# Patient Record
Sex: FEMALE | Race: WHITE | Employment: UNEMPLOYED | URBAN - METROPOLITAN AREA
[De-identification: names, ages, dates, MRNs, and addresses within clinical notes are randomized per-mention and may not be internally consistent; named-entity substitution may affect disease eponyms.]

---
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
## 2019-01-01 ENCOUNTER — HOSPITAL (OUTPATIENT)
Dept: OTHER | Age: 19
End: 2019-01-01
Attending: FAMILY MEDICINE

## 2019-03-27 ENCOUNTER — HOSPITAL ENCOUNTER (EMERGENCY)
Facility: HOSPITAL | Age: 19
Discharge: HOME | End: 2019-03-27
Attending: PEDIATRICS
Payer: COMMERCIAL

## 2019-03-27 VITALS
HEART RATE: 90 BPM | SYSTOLIC BLOOD PRESSURE: 123 MMHG | RESPIRATION RATE: 18 BRPM | WEIGHT: 129.63 LBS | TEMPERATURE: 98 F | OXYGEN SATURATION: 100 % | DIASTOLIC BLOOD PRESSURE: 77 MMHG | BODY MASS INDEX: 20 KG/M2

## 2019-03-27 DIAGNOSIS — S61.210A LACERATION OF RIGHT INDEX FINGER WITHOUT FOREIGN BODY WITHOUT DAMAGE TO NAIL, INITIAL ENCOUNTER: Primary | ICD-10-CM

## 2019-03-27 PROCEDURE — 99282 EMERGENCY DEPT VISIT SF MDM: CPT

## 2019-03-27 PROCEDURE — 12001 RPR S/N/AX/GEN/TRNK 2.5CM/<: CPT

## 2019-03-27 PROCEDURE — 99283 EMERGENCY DEPT VISIT LOW MDM: CPT

## 2019-03-27 RX ORDER — PROPRANOLOL HYDROCHLORIDE 10 MG/1
5 TABLET ORAL NIGHTLY
COMMUNITY

## 2019-03-27 RX ORDER — RANITIDINE 150 MG/1
150 CAPSULE ORAL 2 TIMES DAILY
COMMUNITY

## 2019-03-27 NOTE — ED INITIAL ASSESSMENT (HPI)
21 y/o female to ED with c/o of laceration to right index finger. Patient was cutting a tag off with scissors, reports scissors got caught on skin.

## 2019-03-27 NOTE — ED PROVIDER NOTES
Patient Seen in: [facility] Emergency Department    History   Patient presents with:  Laceration Abrasion (integumentary)    Stated Complaint: finger laceration    HPI    21-year-old female with right second finger laceration.   She is cutting intact intact   Neurological: She is alert and oriented to person, place, and time. No cranial nerve deficit. She exhibits normal muscle tone. Coordination normal.   Skin: Skin is warm. No rash noted. She is not diaphoretic. No pallor.    Psychiatric: She has a no or worsening symptoms.               Disposition and Plan     Clinical Impression:  Laceration of right index finger without foreign body without damage to nail, initial encounter  (primary encounter diagnosis)    Disposition:  Discharge  3/27/2019  4:58 pm

## 2021-05-21 ENCOUNTER — IMMUNIZATION (OUTPATIENT)
Dept: LAB | Facility: HOSPITAL | Age: 21
End: 2021-05-21
Attending: EMERGENCY MEDICINE
Payer: COMMERCIAL

## 2021-05-21 DIAGNOSIS — Z23 NEED FOR VACCINATION: Primary | ICD-10-CM

## 2021-05-21 PROCEDURE — 0001A SARSCOV2 VAC 30MCG/0.3ML IM: CPT

## 2021-06-11 ENCOUNTER — IMMUNIZATION (OUTPATIENT)
Dept: LAB | Facility: HOSPITAL | Age: 21
End: 2021-06-11
Attending: EMERGENCY MEDICINE
Payer: COMMERCIAL

## 2021-06-11 DIAGNOSIS — Z23 NEED FOR VACCINATION: Primary | ICD-10-CM

## 2021-06-11 PROCEDURE — 0002A SARSCOV2 VAC 30MCG/0.3ML IM: CPT

## 2023-06-08 ENCOUNTER — HOSPITAL ENCOUNTER (OUTPATIENT)
Dept: NUCLEAR MEDICINE | Facility: HOSPITAL | Age: 23
Discharge: HOME | End: 2023-06-08
Attending: FAMILY MEDICINE
Payer: COMMERCIAL

## 2023-06-08 DIAGNOSIS — K82.9 DISEASE OF GALLBLADDER: ICD-10-CM

## 2023-06-08 PROCEDURE — 78227 HEPATOBIL SYST IMAGE W/DRUG: CPT | Performed by: FAMILY MEDICINE

## 2023-07-12 ENCOUNTER — OFFICE VISIT (OUTPATIENT)
Facility: LOCATION | Age: 23
End: 2023-07-12
Payer: COMMERCIAL

## 2023-07-12 VITALS — HEART RATE: 97 BPM | TEMPERATURE: 99 F

## 2023-07-12 DIAGNOSIS — Q79.60 EHLERS-DANLOS SYNDROME: ICD-10-CM

## 2023-07-12 DIAGNOSIS — K82.8 BILIARY DYSKINESIA: Primary | ICD-10-CM

## 2023-07-17 ENCOUNTER — TELEPHONE (OUTPATIENT)
Facility: LOCATION | Age: 23
End: 2023-07-17

## 2023-07-17 DIAGNOSIS — K82.8 BILIARY DYSKINESIA: Primary | ICD-10-CM

## 2023-07-17 NOTE — TELEPHONE ENCOUNTER
S/w patient about increase of pain and frequency of gallbladder attacks. Pt reports low grade fevers after eating(t-max 100.9), denies nausea, having normal bms and has been sticking to a low fat diet. Pt has refrained from taking ibuprofen in hopes of having surgery moved up,  Discussed with Chuyita King and surgery rescheduled for 7/20. Reviews surgery check list with patient and case change placed in epic. Pt verbalized understanding.

## 2023-07-17 NOTE — TELEPHONE ENCOUNTER
Good evening,  The patient called and states she has a new symptoms and have some questions about. The symptoms are low grade fever, fatigue, increasing pain after meals. She just want some help on get a solution to the issues.     Call back # [phone]

## 2023-07-18 ENCOUNTER — LAB ENCOUNTER (OUTPATIENT)
Dept: LAB | Facility: HOSPITAL | Age: 23
End: 2023-07-18
Attending: SURGERY
Payer: COMMERCIAL

## 2023-07-18 ENCOUNTER — LABORATORY ENCOUNTER (OUTPATIENT)
Dept: LAB | Facility: HOSPITAL | Age: 23
End: 2023-07-18
Attending: SURGERY
Payer: COMMERCIAL

## 2023-07-18 ENCOUNTER — EKG ENCOUNTER (OUTPATIENT)
Dept: LAB | Facility: HOSPITAL | Age: 23
End: 2023-07-18
Attending: SURGERY
Payer: COMMERCIAL

## 2023-07-18 DIAGNOSIS — Z01.818 PRE-OP TESTING: ICD-10-CM

## 2023-07-18 LAB
ATRIAL RATE: 82 BPM
ERYTHROCYTE [DISTWIDTH] IN BLOOD BY AUTOMATED COUNT: 12.2 %
HCT VFR BLD AUTO: 37.9 %
HGB BLD-MCNC: 13.2 G/DL
MCH RBC QN AUTO: 31.7 PG (ref 26–34)
MCHC RBC AUTO-ENTMCNC: 34.8 G/DL (ref 31–37)
MCV RBC AUTO: 91.1 FL
PLATELET # BLD AUTO: 282 10(3)UL (ref 150–450)
Q-T INTERVAL: 370 MS
QRS DURATION: 90 MS
QTC CALCULATION (BEZET): 432 MS
R AXIS: 81 DEGREES
RBC # BLD AUTO: 4.16 X10(6)UL
T AXIS: 48 DEGREES
VENTRICULAR RATE: 82 BPM
WBC # BLD AUTO: 8 X10(3) UL (ref 4–11)

## 2023-07-18 PROCEDURE — 85027 COMPLETE CBC AUTOMATED: CPT

## 2023-07-18 PROCEDURE — 87635 SARS-COV-2 COVID-19 AMP PRB: CPT

## 2023-07-18 PROCEDURE — 36415 COLL VENOUS BLD VENIPUNCTURE: CPT

## 2023-07-18 PROCEDURE — 93010 ELECTROCARDIOGRAM REPORT: CPT | Performed by: INTERNAL MEDICINE

## 2023-07-18 PROCEDURE — 93005 ELECTROCARDIOGRAM TRACING: CPT

## 2023-07-19 LAB — SARS-COV-2 RNA RESP QL NAA+PROBE: NOT DETECTED

## 2023-07-20 ENCOUNTER — APPOINTMENT (OUTPATIENT)
Dept: GENERAL RADIOLOGY | Facility: HOSPITAL | Age: 23
End: 2023-07-20
Attending: SURGERY
Payer: COMMERCIAL

## 2023-07-20 ENCOUNTER — ANESTHESIA EVENT (OUTPATIENT)
Dept: SURGERY | Facility: HOSPITAL | Age: 23
End: 2023-07-20
Payer: COMMERCIAL

## 2023-07-20 ENCOUNTER — HOSPITAL ENCOUNTER (OUTPATIENT)
Facility: HOSPITAL | Age: 23
Setting detail: HOSPITAL OUTPATIENT SURGERY
Discharge: HOME | End: 2023-07-20
Attending: SURGERY | Admitting: SURGERY
Payer: COMMERCIAL

## 2023-07-20 ENCOUNTER — ANESTHESIA (OUTPATIENT)
Dept: SURGERY | Facility: HOSPITAL | Age: 23
End: 2023-07-20
Payer: COMMERCIAL

## 2023-07-20 VITALS
WEIGHT: 127 LBS | BODY MASS INDEX: 19.93 KG/M2 | HEART RATE: 96 BPM | OXYGEN SATURATION: 99 % | HEIGHT: 67 IN | TEMPERATURE: 99 F | RESPIRATION RATE: 16 BRPM | DIASTOLIC BLOOD PRESSURE: 69 MMHG | SYSTOLIC BLOOD PRESSURE: 106 MMHG

## 2023-07-20 DIAGNOSIS — Z01.818 PRE-OP TESTING: Primary | ICD-10-CM

## 2023-07-20 DIAGNOSIS — K82.8 BILIARY DYSKINESIA: ICD-10-CM

## 2023-07-20 LAB — B-HCG UR QL: NEGATIVE

## 2023-07-20 PROCEDURE — 0FT44ZZ RESECTION OF GALLBLADDER, PERCUTANEOUS ENDOSCOPIC APPROACH: ICD-10-PCS | Performed by: SURGERY

## 2023-07-20 PROCEDURE — BF532Z0 OTHER IMAGING OF GALLBLADDER AND BILE DUCTS USING FLUORESCING AGENT, INTRAOPERATIVE: ICD-10-PCS | Performed by: SURGERY

## 2023-07-20 PROCEDURE — 81025 URINE PREGNANCY TEST: CPT

## 2023-07-20 RX ORDER — SODIUM CHLORIDE, SODIUM LACTATE, POTASSIUM CHLORIDE, CALCIUM CHLORIDE 600; 310; 30; 20 MG/100ML; MG/100ML; MG/100ML; MG/100ML
INJECTION, SOLUTION INTRAVENOUS CONTINUOUS
Status: DISCONTINUED | OUTPATIENT
Start: 2023-07-20 | End: 2023-07-20

## 2023-07-20 RX ORDER — HYDROCODONE BITARTRATE AND ACETAMINOPHEN 5; 325 MG/1; MG/1
2 TABLET ORAL ONCE AS NEEDED
Status: COMPLETED | OUTPATIENT
Start: 2023-07-20 | End: 2023-07-20

## 2023-07-20 RX ORDER — HYDROCODONE BITARTRATE AND ACETAMINOPHEN 5; 325 MG/1; MG/1
1 TABLET ORAL ONCE AS NEEDED
Status: COMPLETED | OUTPATIENT
Start: 2023-07-20 | End: 2023-07-20

## 2023-07-20 RX ORDER — MIDAZOLAM HYDROCHLORIDE 1 MG/ML
INJECTION INTRAMUSCULAR; INTRAVENOUS AS NEEDED
Status: DISCONTINUED | OUTPATIENT
Start: 2023-07-20 | End: 2023-07-20 | Stop reason: SURG

## 2023-07-20 RX ORDER — ACETAMINOPHEN 10 MG/ML
INJECTION, SOLUTION INTRAVENOUS AS NEEDED
Status: DISCONTINUED | OUTPATIENT
Start: 2023-07-20 | End: 2023-07-20 | Stop reason: SURG

## 2023-07-20 RX ORDER — PROCHLORPERAZINE EDISYLATE 5 MG/ML
5 INJECTION INTRAMUSCULAR; INTRAVENOUS EVERY 8 HOURS PRN
Status: DISCONTINUED | OUTPATIENT
Start: 2023-07-20 | End: 2023-07-20

## 2023-07-20 RX ORDER — BUPIVACAINE HYDROCHLORIDE AND EPINEPHRINE 5; 5 MG/ML; UG/ML
INJECTION, SOLUTION EPIDURAL; INTRACAUDAL; PERINEURAL AS NEEDED
Status: DISCONTINUED | OUTPATIENT
Start: 2023-07-20 | End: 2023-07-20

## 2023-07-20 RX ORDER — HEPARIN SODIUM 5000 [USP'U]/ML
5000 INJECTION, SOLUTION INTRAVENOUS; SUBCUTANEOUS ONCE
Status: COMPLETED | OUTPATIENT
Start: 2023-07-20 | End: 2023-07-20

## 2023-07-20 RX ORDER — NALOXONE HYDROCHLORIDE 0.4 MG/ML
80 INJECTION, SOLUTION INTRAMUSCULAR; INTRAVENOUS; SUBCUTANEOUS AS NEEDED
Status: DISCONTINUED | OUTPATIENT
Start: 2023-07-20 | End: 2023-07-20

## 2023-07-20 RX ORDER — ACETAMINOPHEN 500 MG
1000 TABLET ORAL ONCE AS NEEDED
Status: COMPLETED | OUTPATIENT
Start: 2023-07-20 | End: 2023-07-20

## 2023-07-20 RX ORDER — PHENYLEPHRINE HCL 10 MG/ML
VIAL (ML) INJECTION AS NEEDED
Status: DISCONTINUED | OUTPATIENT
Start: 2023-07-20 | End: 2023-07-20 | Stop reason: SURG

## 2023-07-20 RX ORDER — HYDROCODONE BITARTRATE AND ACETAMINOPHEN 5; 325 MG/1; MG/1
1 TABLET ORAL EVERY 6 HOURS PRN
Qty: 10 TABLET | Refills: 0 | Status: SHIPPED | OUTPATIENT
Start: 2023-07-20

## 2023-07-20 RX ORDER — ONDANSETRON 2 MG/ML
4 INJECTION INTRAMUSCULAR; INTRAVENOUS EVERY 6 HOURS PRN
Status: DISCONTINUED | OUTPATIENT
Start: 2023-07-20 | End: 2023-07-20

## 2023-07-20 RX ORDER — ONDANSETRON 2 MG/ML
INJECTION INTRAMUSCULAR; INTRAVENOUS AS NEEDED
Status: DISCONTINUED | OUTPATIENT
Start: 2023-07-20 | End: 2023-07-20 | Stop reason: SURG

## 2023-07-20 RX ORDER — MIDAZOLAM HYDROCHLORIDE 1 MG/ML
1 INJECTION INTRAMUSCULAR; INTRAVENOUS EVERY 5 MIN PRN
Status: DISCONTINUED | OUTPATIENT
Start: 2023-07-20 | End: 2023-07-20

## 2023-07-20 RX ORDER — LIDOCAINE HYDROCHLORIDE 10 MG/ML
INJECTION, SOLUTION EPIDURAL; INFILTRATION; INTRACAUDAL; PERINEURAL AS NEEDED
Status: DISCONTINUED | OUTPATIENT
Start: 2023-07-20 | End: 2023-07-20 | Stop reason: SURG

## 2023-07-20 RX ORDER — ROCURONIUM BROMIDE 10 MG/ML
INJECTION, SOLUTION INTRAVENOUS AS NEEDED
Status: DISCONTINUED | OUTPATIENT
Start: 2023-07-20 | End: 2023-07-20 | Stop reason: SURG

## 2023-07-20 RX ORDER — KETOROLAC TROMETHAMINE 30 MG/ML
INJECTION, SOLUTION INTRAMUSCULAR; INTRAVENOUS AS NEEDED
Status: DISCONTINUED | OUTPATIENT
Start: 2023-07-20 | End: 2023-07-20 | Stop reason: SURG

## 2023-07-20 RX ORDER — DEXAMETHASONE SODIUM PHOSPHATE 4 MG/ML
VIAL (ML) INJECTION AS NEEDED
Status: DISCONTINUED | OUTPATIENT
Start: 2023-07-20 | End: 2023-07-20 | Stop reason: SURG

## 2023-07-20 RX ORDER — CLINDAMYCIN PHOSPHATE 900 MG/50ML
900 INJECTION INTRAVENOUS ONCE
Status: COMPLETED | OUTPATIENT
Start: 2023-07-20 | End: 2023-07-20

## 2023-07-20 RX ORDER — ACETAMINOPHEN 500 MG
1000 TABLET ORAL ONCE
Status: DISCONTINUED | OUTPATIENT
Start: 2023-07-20 | End: 2023-07-20 | Stop reason: HOSPADM

## 2023-07-20 RX ADMIN — MIDAZOLAM HYDROCHLORIDE 2 MG: 1 INJECTION INTRAMUSCULAR; INTRAVENOUS at 15:48:00

## 2023-07-20 RX ADMIN — CLINDAMYCIN PHOSPHATE 900 MG: 900 INJECTION INTRAVENOUS at 16:02:00

## 2023-07-20 RX ADMIN — DEXAMETHASONE SODIUM PHOSPHATE 8 MG: 4 MG/ML VIAL (ML) INJECTION at 16:00:00

## 2023-07-20 RX ADMIN — ROCURONIUM BROMIDE 50 MG: 10 INJECTION, SOLUTION INTRAVENOUS at 16:00:00

## 2023-07-20 RX ADMIN — KETOROLAC TROMETHAMINE 30 MG: 30 INJECTION, SOLUTION INTRAMUSCULAR; INTRAVENOUS at 16:58:00

## 2023-07-20 RX ADMIN — ACETAMINOPHEN 1000 MG: 10 INJECTION, SOLUTION INTRAVENOUS at 16:26:00

## 2023-07-20 RX ADMIN — ONDANSETRON 4 MG: 2 INJECTION INTRAMUSCULAR; INTRAVENOUS at 16:58:00

## 2023-07-20 RX ADMIN — SODIUM CHLORIDE, SODIUM LACTATE, POTASSIUM CHLORIDE, CALCIUM CHLORIDE: 600; 310; 30; 20 INJECTION, SOLUTION INTRAVENOUS at 15:48:00

## 2023-07-20 RX ADMIN — LIDOCAINE HYDROCHLORIDE 50 MG: 10 INJECTION, SOLUTION EPIDURAL; INFILTRATION; INTRACAUDAL; PERINEURAL at 16:00:00

## 2023-07-20 RX ADMIN — PHENYLEPHRINE HCL 50 MCG: 10 MG/ML VIAL (ML) INJECTION at 16:26:00

## 2023-07-20 RX ADMIN — PHENYLEPHRINE HCL 50 MCG: 10 MG/ML VIAL (ML) INJECTION at 16:16:00

## 2023-07-20 NOTE — INTERVAL H&P NOTE
Pre-op Diagnosis: Biliary dyskinesia [K82.8]    The above referenced H&P was reviewed by Yash Kaye MD on 7/20/2023, the patient was examined and no significant changes have occurred in the patient's condition since the H&P was performed. I discussed with the patient and/or legal representative the potential benefits, risks and side effects of this procedure; the likelihood of the patient achieving goals; and potential problems that might occur during recuperation. I discussed reasonable alternatives to the procedure, including risks, benefits and side effects related to the alternatives and risks related to not receiving this procedure. We will proceed with procedure as planned.

## 2023-07-20 NOTE — ANESTHESIA POSTPROCEDURE EVALUATION
[address] Patient Status:  Hospital Outpatient Surgery   Age/Gender 24year old female MRN [id number]   [facility] SURGERY Attending Lory Aburto MD   [facility] Day # 0 PCP France Ball DO       Anesthesia Post-op Note    LAPAROSCOPIC CHOLECYSTECTOMY WITH ATTEMPTED CHOLANGIOGRAM    Procedure Summary       Date: 07/20/23 Room / Location: [facility] MAIN OR 15 / 1404 [facility] OR    Anesthesia Start: 8401 Anesthesia Stop: 2179    Procedure: LAPAROSCOPIC CHOLECYSTECTOMY WITH ATTEMPTED CHOLANGIOGRAM (Abdomen) Diagnosis:       Biliary dyskinesia      (Biliary dyskinesia [K82.8])    Surgeons: Lory Aburto MD Anesthesiologist: Jennifer Bravo MD    Anesthesia Type: general ASA Status: 2            Anesthesia Type: general    Vitals Value Taken Time   /72 07/20/23 1727   Temp 97 07/20/23 1727   Pulse 110 07/20/23 1727   Resp 19 07/20/23 1727   SpO2 99 07/20/23 1727       Patient Location: PACU    Anesthesia Type: general    Airway Patency: patent    Postop Pain Control: adequate    Mental Status: mildly sedated but able to meaningfully participate in the post-anesthesia evaluation    Nausea/Vomiting: none    Cardiopulmonary/Hydration status: stable euvolemic    Complications: no apparent anesthesia related complications    Postop vital signs: stable    Dental Exam: Unchanged from Preop    Patient to be discharged from PACU when criteria met.

## 2023-07-20 NOTE — OPERATIVE REPORT
[facility]  Op Note    Latricia Spine Location: OR   CSN [id number] MRN [id number]   Admission Date 7/20/2023 Operation Date 7/20/2023   Attending Physician Noelle Bee MD Operating Physician Ehsan Coker MD   DATE OF OPERATION:  7/20/2023   PREOPERATIVE DIAGNOSIS: Biliary dyskinesia  POSTOPERATIVE DIAGNOSIS: Biliary dyskinesia  PROCEDURE PERFORMED: Laparoscopic cholecystectomy with attempted intraoperative cholangiogram.   SURGEON:  Ehsan Coker MD  ASSISTANT: Torie Mercer PA-C (Her assistance was essential to the performance and conduct of this case, especially in the performance of the cholangiogram and the careful dissection needed in and around the triangle of Calot and gallbladder hilum.)  ANESTHESIA: General.   SPECIMEN: Gallbladder to pathology. BLOOD LOSS:  10 cc   COMPLICATIONS: None. INDICATIONS FOR PROCEDURE: The patient is a 50-year-old female who has been experiencing abdominal pain after eating fatty foods for over a year. Work-up included a HIDA scan that reproduced her pain. She was offered laparoscopic cholecystectomy intraoperative cholangiogram.  The risks, benefits, and alternatives were discussed in detail with the patient. Risks include but are not limited to bleeding, wound infection, right shoulder pain, injury to common bile duct, injury to the liver and injury to other intraabdominal contents. The patient was agreeable to proceed with the operation. FINDINGS: The cystic duct was tiny, permitting cannulation by only the wire used introduced the cholangiocatheter. The cholangiocatheter was never able to be advanced into the duct itself. Therefore, after multiple attempts, cholangiography was aborted. The gallbladder itself did have dense adhesions at the hilum. OPERATIVE TECHNIQUE: After informed consent was obtained, the patient was taken to the operating room and placed in the supine position. General anesthesia was induced.  The abdomen was then prepped and draped in the usual sterile fashion. The umbilicus was inverted, and a curvilinear incision was made superior to it with an 11-blade scalpel. The Veress needle was passed into the abdomen, and pneumoperitoneum was instituted without difficulty. The 11 mm trocar was passed through this incision site. The abdomen was inspected and found to be grossly normal. Under direct vision, a 5 mm subxiphoid port and two right-sided lateral 5 mm ports were placed. The patient was placed head-up, right side up. The gallbladder was grasped and retracted cranially. Dissection commenced around the cystic duct and cystic artery, which were isolated. Dissection was kept above the line of Rouviere. The critical view was obtained, demonstrating 2 structures directly entering the gallbladder. One clip was placed towards the gallbladder on the cystic duct, and the duct was partially transected. The cystic duct was attempted to be cannulated. The cystic duct stump was so tiny that it only permitted the wire to be introduced. When the cholangiocatheter was advanced, the catheter was the same size as the duct, and the duct would not permit its introduction. After multiple attempts to cannulate the duct, the cholangiogram was aborted. Three clips were placed opposite the partial transection. The duct was then completely transected. The cystic artery was isolated, one clip was placed towards the gallbladder, two away, and the artery was transected. The gallbladder was then dissected free from the liver bed using cautery. The gallbladder was then withdrawn through the umbilical port site after being placed in an Endo Catch bag. Inspection of the liver bed revealed some bleeding that was controlled with cautery. The abdomen was copiously irrigated with normal saline. Once hemostasis was assured and the irrigant returned as clear and colorless, the right-sided ports were withdrawn under direct vision.  The pneumoperitoneum was aspirated, and the remaining ports were withdrawn. The fascia at the umbilical port site was reapproximated with Maxon suture, and the skin at all four incisions was reapproximated with subcuticular Vicryl suture. 0.5% Marcaine with epinephrine was injected around the incisions to help with postoperative pain control. Steri-Strips and Mastisol were placed across the incisions. The patient tolerated the procedure well, was extubated in the OR, and went to the PACU in good condition.   Jeff Tsang MD

## 2023-07-20 NOTE — ANESTHESIA PROCEDURE NOTES
Airway  Date/Time: 7/20/2023 4:00 PM  Urgency: elective      General Information and Staff    Patient location during procedure: OR  Anesthesiologist: Leotha Lennox, MD  Performed: anesthesiologist   Performed by: Leotha Lennox, MD  Authorized by: Leotha Lennox, MD      Indications and Patient Condition  Indications for airway management: anesthesia  Sedation level: deep  Preoxygenated: yes  Patient position: sniffing  Mask difficulty assessment: 1 - vent by mask    Final Airway Details  Final airway type: endotracheal airway      Successful airway: ETT  Cuffed: yes   Successful intubation technique: direct laryngoscopy  Endotracheal tube insertion site: oral  Blade: GlideScope  Blade size: #3  ETT size (mm): 7.0    Placement verified by: capnometry   Cuff volume (mL): 6  Measured from: lips  ETT to lips (cm): 22  Number of attempts at approach: 1    Additional Comments  atraumatic

## 2023-07-21 ENCOUNTER — APPOINTMENT (OUTPATIENT)
Dept: NUCLEAR MEDICINE | Facility: HOSPITAL | Age: 23
End: 2023-07-21
Attending: EMERGENCY MEDICINE
Payer: COMMERCIAL

## 2023-07-21 ENCOUNTER — HOSPITAL ENCOUNTER (OUTPATIENT)
Facility: HOSPITAL | Age: 23
Setting detail: OBSERVATION
Discharge: HOME | End: 2023-07-22
Attending: EMERGENCY MEDICINE | Admitting: SURGERY
Payer: COMMERCIAL

## 2023-07-21 ENCOUNTER — APPOINTMENT (OUTPATIENT)
Dept: CT IMAGING | Facility: HOSPITAL | Age: 23
End: 2023-07-21
Attending: EMERGENCY MEDICINE
Payer: COMMERCIAL

## 2023-07-21 ENCOUNTER — TELEPHONE (OUTPATIENT)
Facility: LOCATION | Age: 23
End: 2023-07-21

## 2023-07-21 DIAGNOSIS — R10.9 ABDOMINAL PAIN OF UNKNOWN ETIOLOGY: Primary | ICD-10-CM

## 2023-07-21 DIAGNOSIS — N39.0 URINARY TRACT INFECTION WITHOUT HEMATURIA, SITE UNSPECIFIED: ICD-10-CM

## 2023-07-21 LAB
ALBUMIN SERPL-MCNC: 3.9 G/DL (ref 3.4–5)
ALBUMIN/GLOB SERPL: 1.1 {RATIO} (ref 1–2)
ALP LIVER SERPL-CCNC: 47 U/L
ALT SERPL-CCNC: 55 U/L
ANION GAP SERPL CALC-SCNC: 4 MMOL/L (ref 0–18)
AST SERPL-CCNC: 39 U/L (ref 15–37)
BASOPHILS # BLD AUTO: 0.02 X10(3) UL (ref 0–0.2)
BASOPHILS NFR BLD AUTO: 0.1 %
BILIRUB SERPL-MCNC: 0.4 MG/DL (ref 0.1–2)
BILIRUB UR QL STRIP.AUTO: NEGATIVE
BUN BLD-MCNC: 6 MG/DL (ref 7–18)
CALCIUM BLD-MCNC: 9.1 MG/DL (ref 8.5–10.1)
CHLORIDE SERPL-SCNC: 106 MMOL/L (ref 98–112)
CLARITY UR REFRACT.AUTO: CLEAR
CO2 SERPL-SCNC: 26 MMOL/L (ref 21–32)
CREAT BLD-MCNC: 0.67 MG/DL
EGFRCR SERPLBLD CKD-EPI 2021: 125 ML/MIN/1.73M2 (ref 60–?)
EOSINOPHIL # BLD AUTO: 0.04 X10(3) UL (ref 0–0.7)
EOSINOPHIL NFR BLD AUTO: 0.3 %
ERYTHROCYTE [DISTWIDTH] IN BLOOD BY AUTOMATED COUNT: 12.1 %
GLOBULIN PLAS-MCNC: 3.4 G/DL (ref 2.8–4.4)
GLUCOSE BLD-MCNC: 99 MG/DL (ref 70–99)
GLUCOSE UR STRIP.AUTO-MCNC: NEGATIVE MG/DL
HCG SERPL QL: NEGATIVE
HCT VFR BLD AUTO: 37.2 %
HGB BLD-MCNC: 12.6 G/DL
IMM GRANULOCYTES # BLD AUTO: 0.07 X10(3) UL (ref 0–1)
IMM GRANULOCYTES NFR BLD: 0.5 %
LIPASE SERPL-CCNC: 17 U/L (ref 13–75)
LYMPHOCYTES # BLD AUTO: 0.93 X10(3) UL (ref 1–4)
LYMPHOCYTES NFR BLD AUTO: 6 %
MCH RBC QN AUTO: 31.1 PG (ref 26–34)
MCHC RBC AUTO-ENTMCNC: 33.9 G/DL (ref 31–37)
MCV RBC AUTO: 91.9 FL
MONOCYTES # BLD AUTO: 0.72 X10(3) UL (ref 0.1–1)
MONOCYTES NFR BLD AUTO: 4.7 %
NEUTROPHILS # BLD AUTO: 13.68 X10 (3) UL (ref 1.5–7.7)
NEUTROPHILS # BLD AUTO: 13.68 X10(3) UL (ref 1.5–7.7)
NEUTROPHILS NFR BLD AUTO: 88.4 %
NITRITE UR QL STRIP.AUTO: NEGATIVE
OSMOLALITY SERPL CALC.SUM OF ELEC: 280 MOSM/KG (ref 275–295)
PH UR STRIP.AUTO: 8 [PH] (ref 5–8)
PLATELET # BLD AUTO: 255 10(3)UL (ref 150–450)
POTASSIUM SERPL-SCNC: 3.9 MMOL/L (ref 3.5–5.1)
PROT SERPL-MCNC: 7.3 G/DL (ref 6.4–8.2)
PROT UR STRIP.AUTO-MCNC: 100 MG/DL
RBC # BLD AUTO: 4.05 X10(6)UL
RBC #/AREA URNS AUTO: >10 /HPF
SODIUM SERPL-SCNC: 136 MMOL/L (ref 136–145)
SP GR UR STRIP.AUTO: 1 (ref 1–1.03)
UROBILINOGEN UR STRIP.AUTO-MCNC: <2 MG/DL
WBC # BLD AUTO: 15.5 X10(3) UL (ref 4–11)
WBC #/AREA URNS AUTO: >50 /HPF
YEAST UR QL: PRESENT /HPF

## 2023-07-21 PROCEDURE — 78226 HEPATOBILIARY SYSTEM IMAGING: CPT | Performed by: EMERGENCY MEDICINE

## 2023-07-21 PROCEDURE — 78830 RP LOCLZJ TUM SPECT W/CT 1: CPT | Performed by: EMERGENCY MEDICINE

## 2023-07-21 PROCEDURE — 74177 CT ABD & PELVIS W/CONTRAST: CPT | Performed by: EMERGENCY MEDICINE

## 2023-07-21 PROCEDURE — 99222 1ST HOSP IP/OBS MODERATE 55: CPT | Performed by: INTERNAL MEDICINE

## 2023-07-21 RX ORDER — HYDROCODONE BITARTRATE AND ACETAMINOPHEN 5; 325 MG/1; MG/1
2 TABLET ORAL EVERY 4 HOURS PRN
Status: DISCONTINUED | OUTPATIENT
Start: 2023-07-21 | End: 2023-07-22

## 2023-07-21 RX ORDER — KETOROLAC TROMETHAMINE 30 MG/ML
30 INJECTION, SOLUTION INTRAMUSCULAR; INTRAVENOUS EVERY 6 HOURS PRN
Status: DISCONTINUED | OUTPATIENT
Start: 2023-07-21 | End: 2023-07-22

## 2023-07-21 RX ORDER — ONDANSETRON 2 MG/ML
4 INJECTION INTRAMUSCULAR; INTRAVENOUS EVERY 6 HOURS PRN
Status: DISCONTINUED | OUTPATIENT
Start: 2023-07-21 | End: 2023-07-22

## 2023-07-21 RX ORDER — ONDANSETRON 2 MG/ML
4 INJECTION INTRAMUSCULAR; INTRAVENOUS ONCE
Status: DISCONTINUED | OUTPATIENT
Start: 2023-07-21 | End: 2023-07-22

## 2023-07-21 RX ORDER — MELATONIN
3 NIGHTLY PRN
Status: DISCONTINUED | OUTPATIENT
Start: 2023-07-21 | End: 2023-07-22

## 2023-07-21 RX ORDER — HYDROCODONE BITARTRATE AND ACETAMINOPHEN 5; 325 MG/1; MG/1
1 TABLET ORAL EVERY 4 HOURS PRN
Status: DISCONTINUED | OUTPATIENT
Start: 2023-07-21 | End: 2023-07-22

## 2023-07-21 RX ORDER — PANTOPRAZOLE SODIUM 40 MG/1
40 TABLET, DELAYED RELEASE ORAL
Status: DISCONTINUED | OUTPATIENT
Start: 2023-07-22 | End: 2023-07-22

## 2023-07-21 RX ORDER — ONDANSETRON 2 MG/ML
4 INJECTION INTRAMUSCULAR; INTRAVENOUS ONCE
Status: COMPLETED | OUTPATIENT
Start: 2023-07-21 | End: 2023-07-21

## 2023-07-21 RX ORDER — SODIUM CHLORIDE 9 MG/ML
INJECTION, SOLUTION INTRAVENOUS CONTINUOUS
Status: DISCONTINUED | OUTPATIENT
Start: 2023-07-21 | End: 2023-07-22

## 2023-07-21 RX ORDER — HEPARIN SODIUM 5000 [USP'U]/ML
5000 INJECTION, SOLUTION INTRAVENOUS; SUBCUTANEOUS EVERY 12 HOURS SCHEDULED
Status: DISCONTINUED | OUTPATIENT
Start: 2023-07-21 | End: 2023-07-22

## 2023-07-21 RX ORDER — KETOROLAC TROMETHAMINE 15 MG/ML
15 INJECTION, SOLUTION INTRAMUSCULAR; INTRAVENOUS EVERY 6 HOURS PRN
Status: DISCONTINUED | OUTPATIENT
Start: 2023-07-21 | End: 2023-07-22

## 2023-07-21 RX ORDER — FLUCONAZOLE 100 MG/1
150 TABLET ORAL ONCE
Status: COMPLETED | OUTPATIENT
Start: 2023-07-22 | End: 2023-07-22

## 2023-07-21 RX ORDER — ACETAMINOPHEN 500 MG
500 TABLET ORAL EVERY 4 HOURS PRN
Status: DISCONTINUED | OUTPATIENT
Start: 2023-07-21 | End: 2023-07-22

## 2023-07-21 RX ORDER — ONDANSETRON 2 MG/ML
4 INJECTION INTRAMUSCULAR; INTRAVENOUS EVERY 4 HOURS PRN
Status: CANCELLED | OUTPATIENT
Start: 2023-07-21 | End: 2023-07-21

## 2023-07-21 RX ORDER — KETOROLAC TROMETHAMINE 15 MG/ML
15 INJECTION, SOLUTION INTRAMUSCULAR; INTRAVENOUS ONCE
Status: COMPLETED | OUTPATIENT
Start: 2023-07-21 | End: 2023-07-21

## 2023-07-22 VITALS
WEIGHT: 132.81 LBS | RESPIRATION RATE: 16 BRPM | TEMPERATURE: 98 F | OXYGEN SATURATION: 95 % | BODY MASS INDEX: 21 KG/M2 | SYSTOLIC BLOOD PRESSURE: 100 MMHG | HEART RATE: 63 BPM | DIASTOLIC BLOOD PRESSURE: 70 MMHG

## 2023-07-22 LAB
ANION GAP SERPL CALC-SCNC: 6 MMOL/L (ref 0–18)
BASOPHILS # BLD AUTO: 0.04 X10(3) UL (ref 0–0.2)
BASOPHILS NFR BLD AUTO: 0.6 %
BUN BLD-MCNC: 6 MG/DL (ref 7–18)
CALCIUM BLD-MCNC: 8.5 MG/DL (ref 8.5–10.1)
CHLORIDE SERPL-SCNC: 111 MMOL/L (ref 98–112)
CO2 SERPL-SCNC: 24 MMOL/L (ref 21–32)
CREAT BLD-MCNC: 0.82 MG/DL
EGFRCR SERPLBLD CKD-EPI 2021: 102 ML/MIN/1.73M2 (ref 60–?)
EOSINOPHIL # BLD AUTO: 0.09 X10(3) UL (ref 0–0.7)
EOSINOPHIL NFR BLD AUTO: 1.3 %
ERYTHROCYTE [DISTWIDTH] IN BLOOD BY AUTOMATED COUNT: 12.6 %
GLUCOSE BLD-MCNC: 84 MG/DL (ref 70–99)
HCT VFR BLD AUTO: 33.2 %
HGB BLD-MCNC: 11.4 G/DL
IMM GRANULOCYTES # BLD AUTO: 0.02 X10(3) UL (ref 0–1)
IMM GRANULOCYTES NFR BLD: 0.3 %
LIPASE SERPL-CCNC: 17 U/L (ref 13–75)
LYMPHOCYTES # BLD AUTO: 2.03 X10(3) UL (ref 1–4)
LYMPHOCYTES NFR BLD AUTO: 29 %
MCH RBC QN AUTO: 31.8 PG (ref 26–34)
MCHC RBC AUTO-ENTMCNC: 34.3 G/DL (ref 31–37)
MCV RBC AUTO: 92.5 FL
MONOCYTES # BLD AUTO: 0.51 X10(3) UL (ref 0.1–1)
MONOCYTES NFR BLD AUTO: 7.3 %
NEUTROPHILS # BLD AUTO: 4.31 X10 (3) UL (ref 1.5–7.7)
NEUTROPHILS # BLD AUTO: 4.31 X10(3) UL (ref 1.5–7.7)
NEUTROPHILS NFR BLD AUTO: 61.5 %
OSMOLALITY SERPL CALC.SUM OF ELEC: 289 MOSM/KG (ref 275–295)
PLATELET # BLD AUTO: 233 10(3)UL (ref 150–450)
POTASSIUM SERPL-SCNC: 3.6 MMOL/L (ref 3.5–5.1)
RBC # BLD AUTO: 3.59 X10(6)UL
SODIUM SERPL-SCNC: 141 MMOL/L (ref 136–145)
WBC # BLD AUTO: 7 X10(3) UL (ref 4–11)

## 2023-07-22 PROCEDURE — 99239 HOSP IP/OBS DSCHRG MGMT >30: CPT | Performed by: STUDENT IN AN ORGANIZED HEALTH CARE EDUCATION/TRAINING PROGRAM

## 2023-07-22 RX ORDER — DOCUSATE SODIUM 100 MG/1
100 CAPSULE, LIQUID FILLED ORAL
Status: DISCONTINUED | OUTPATIENT
Start: 2023-07-22 | End: 2023-07-22

## 2023-07-22 RX ORDER — NITROFURANTOIN 25; 75 MG/1; MG/1
100 CAPSULE ORAL 2 TIMES DAILY
Qty: 14 CAPSULE | Refills: 0 | Status: SHIPPED | OUTPATIENT
Start: 2023-07-22 | End: 2023-07-29

## 2023-07-22 RX ORDER — POLYETHYLENE GLYCOL 3350 17 G/17G
17 POWDER, FOR SOLUTION ORAL DAILY PRN
Status: DISCONTINUED | OUTPATIENT
Start: 2023-07-22 | End: 2023-07-22

## 2023-07-22 RX ORDER — ONDANSETRON 4 MG/1
4 TABLET, ORALLY DISINTEGRATING ORAL EVERY 8 HOURS PRN
Qty: 15 TABLET | Refills: 0 | Status: SHIPPED | OUTPATIENT
Start: 2023-07-22

## 2023-07-22 NOTE — ED QUICK NOTES
Orders for admission, patient is aware of plan and ready to go upstairs. Any questions, please call ED RN [address] at extension [phone].      Patient Covid vaccination status: Fully vaccinated     COVID Test Ordered in ED: None    COVID Suspicion at Admission: N/A    Running Infusions:      Mental Status/LOC at time of transport: A&Ox4    Other pertinent information:   CIWA score: N/A   NIH score:  N/A

## 2023-07-22 NOTE — PROGRESS NOTES
Pt is alert and oriented x4. On room air. Denies shortness of breath or difficulty in breathing. No gas and not belching. Pain being managed through mar. Up independently. Ivf infusing. On a clear liquid diet. X4 lap sites with steri strips. Updated on plan of care. Call light within reach. Pt cleared for dc. Waiting for med rec to be completed.  Family updated on plan

## 2023-07-22 NOTE — TELEPHONE ENCOUNTER
Late entry-    Nae's mother called the office this morning at approximately 9 AM this morning. She stated that Nalini Cordon had laparoscopic cholecystectomy   Yesterday performed by Dr. Amanda Najera. Per the operative report, diagnosis was biliary dyskinesia and intraoperative cholangiogram was not able to be performed. Nae's mother reports that Nalini Cordon has had very difficult night with severe abdominal pain associated with nausea. Nae's mother has been states that she was able to eat a half a piece of toast at approximately 10:30 PM and then took a Norco at 6 PM however this did not relieve any of her symptoms. Leti Barajas reports that Nalini Cordon continues to have severe abdominal pain and nausea. She has not yet had emesis. At this time the recommendation is for Nalini Cordon to proceed to [facility] ED for further work-up beginning with serology, CBC, CMP and possible HIDA scan to assess for bile leak. Leti Barajas is comfortable with this recommendation. She will be bringing Nalini Cordon to [facility]  Dr. Amanda Najera was notified of patient's symptoms and plan for evaluation in ED.     Sania Manley MD, FACS

## 2023-07-22 NOTE — PLAN OF CARE
NURSING ADMISSION NOTE:  Patient admitted via ED at about 2220. Oriented to room. Safety precautions initiated. Bed in low position. Call light in reach. A&Ox4. VSS. RA. Denies chest pain and SOB. GI: Abdomen soft, nondistended, with tenderness. Passage of gas. Denies nausea. : Voids- with adequate output. Pain managed with PRN pain medications per MAR. Up ad annelise. Incisions: X4 lap sites to abdomen with steri strips CDI. Diet: Clear liquids- tolerating well. IVF running per order. All appropriate safety measures in place. All questions and concerns addressed.

## 2023-07-25 NOTE — DISCHARGE SUMMARY
[facility] HOSPITALIST  DISCHARGE SUMMARY     [address] [facility] Patient Status:  Observation    2000 MRN [id number]   [facility] 3NW-A Attending No att. providers found   Hosp Day # 0 PCP Beryle Hua, DO     Date of Admission: 2023  Date of Discharge: 2023  Discharge Disposition: Home or Self Care    Admitting Diagnosis:   Abdominal pain of unknown etiology [R10.9]  Urinary tract infection without hematuria, site unspecified [N39.0]    Hospital Discharge Diagnoses:   Postoperative pain  Status post laparoscopic cholecystectomy  UTI  Celiac's disease  POTS  GERD  Lilliana-Danlos syndrome    Lace+ Score: 22  59-90 High Risk  29-58 Medium Risk  0-28   Low Risk. TCM Follow-Up Recommendation:  LACE < 29: Low Risk of readmission after discharge from the hospital; Still recommend for TCM follow-up. Discharge Diagnosis:   Postoperative pain status post laparoscopic cholecystectomy    History of Present Illness: Srikanth Patelswtony Carpenter is a 24year old female admitted with abdominal pain. Patient had recent laparoscopic cholecystectomy yesterday. Patient states she has had generalized abdominal pain, more localized to right upper quadrant. 6 out of 10 pain. Associated nausea. Denies any vomiting. Denies any diarrhea. Not passing gas yet. Has some urinary urgency and not going much according to patient. Patient seen with patient's parents at bedside. Denies any fever or chills. Patient also states she is on her menstrual period and has abdominal cramps related to that also    Brief Synopsis: Patient started on IV fluids, pain control, general surgery consulted. Patient had improved pain control with oral medications, CT abdomen pelvis and HIDA scan without any biliary leak or new acute intra-abdominal processes noted. Labs with incidental findings of UTI, started on IV antibiotics, discharged on p.o. Macrobid.   Patient able to tolerate diet without nausea and vomiting proved pain at time of discharge and to follow-up with general surgery for outpatient following of postop care. Procedures during hospitalization:   None    Incidental or significant findings and recommendations (brief descriptions): Follow-up outpatient with general surgery for postop care    Lab/Test results pending at Discharge:   none    Consultants:  General surgery    Discharge Medication List:     Discharge Medications        START taking these medications        Instructions Prescription details   nitrofurantoin monohydrate macro 100 MG Caps  Commonly known as: Macrobid      Take 1 capsule (100 mg total) by mouth 2 (two) times daily for 7 days. Stop taking on: July 29, 2023  Quantity: 14 capsule  Refills: 0     ondansetron 4 MG Tbdp  Commonly known as: Zofran-ODT      Take 1 tablet (4 mg total) by mouth every 8 (eight) hours as needed for Nausea. Quantity: 15 tablet  Refills: 0            CONTINUE taking these medications        Instructions Prescription details   HYDROcodone-acetaminophen 5-325 MG Tabs  Commonly known as: Norco      Take 1 tablet by mouth every 6 (six) hours as needed for Pain. Quantity: 10 tablet  Refills: 0     IBUPROFEN IB OR      Take by mouth. Refills: 0     propranolol 10 MG Tabs  Commonly known as: Inderal      Take 0.5 tablets (5 mg total) by mouth nightly. Refills: 0     raNITIdine HCl 150 MG Caps  Commonly known as: ZANTAC      Take 1 capsule (150 mg total) by mouth 2 (two) times daily.    Refills: 0               Where to Get Your Medications        These medications were sent to [address], [phone], [address] [address]      Phone: [phone]   nitrofurantoin monohydrate macro 100 MG Caps  ondansetron 4 MG Tbdp         [place]    Follow-up appointment:   Orly Fine  [phone]    Schedule an appointment as soon as possible for a visit in 2 week(s)  Okay to see Physician Assistant      Vital signs:       Physical Exam: See exam from day of discharge note  -----------------------------------------------------------------------------------------------  PATIENT DISCHARGE INSTRUCTIONS: See electronic chart    Malachi Leger MD 7/24/2023    Time spent:  > 30 minutes

## 2023-08-02 ENCOUNTER — OFFICE VISIT (OUTPATIENT)
Facility: LOCATION | Age: 23
End: 2023-08-02

## 2023-08-02 VITALS — HEART RATE: 81 BPM | TEMPERATURE: 97 F

## 2023-08-02 DIAGNOSIS — Z98.890 POST-OPERATIVE STATE: Primary | ICD-10-CM

## (undated) DEVICE — C-ARM: Brand: UNBRANDED

## (undated) DEVICE — TROCAR: Brand: KII SHIELDED BLADED ACCESS SYSTEM

## (undated) DEVICE — TRADITIONAL MARYLAND DISSECTOR TIP, DISPOSABLE: Brand: RENEW

## (undated) DEVICE — TIGERTAIL 5F FLXTIP 70CM

## (undated) DEVICE — L-HOOK CAUTERY PROBE TIP, DISPOSABLE: Brand: RENEW

## (undated) DEVICE — SOL NACL IRRIG 0.9% 1000ML BTL

## (undated) DEVICE — DISPOSABLE LAPAROSCOPIC CLIP APPLIER WITH 20 CLIPS.: Brand: EPIX® UNIVERSAL CLIP APPLIER

## (undated) DEVICE — 40580 - THE PINK PAD - ADVANCED TRENDELENBURG POSITIONING KIT: Brand: 40580 - THE PINK PAD - ADVANCED TRENDELENBURG POSITIONING KIT

## (undated) DEVICE — INSUFFLATION NEEDLE TO ESTABLISH PNEUMOPERITONEUM.: Brand: INSUFFLATION NEEDLE

## (undated) DEVICE — UNDYED BRAIDED (POLYGLACTIN 910), SYNTHETIC ABSORBABLE SUTURE: Brand: COATED VICRYL

## (undated) DEVICE — SLEEVE KENDALL SCD EXPRESS MED

## (undated) DEVICE — ZIPWIRE GUIDE .038X150 STR/STF

## (undated) DEVICE — GRABBER GRASPER TIP, DISPOSABLE: Brand: RENEW

## (undated) DEVICE — TROCAR: Brand: KII® SLEEVE

## (undated) DEVICE — ENDOCUT SCISSOR TIP, DISPOSABLE: Brand: RENEW

## (undated) DEVICE — MONOFILAMENT ABSORBABLE SUTURE: Brand: MAXON

## (undated) DEVICE — APPLICATOR CHLORAPREP 26ML

## (undated) DEVICE — LIGHT HANDLE

## (undated) DEVICE — LAP CHOLE/APPY CDS-LF: Brand: MEDLINE INDUSTRIES, INC.

## (undated) DEVICE — STERILE POLYISOPRENE POWDER-FREE SURGICAL GLOVES: Brand: PROTEXIS

## (undated) NOTE — ED AVS SNAPSHOT
Algallo Rivas   MRN: [id number]    Department:  [facility] Emergency Department   Date of Visit:  8/25/2018           Disclosure     Insurance plans vary and the physician(s) referred by the ER may not be covered by your plan.  Please contact tell this physician (or your personal doctor if your instructions are to return to your personal doctor) about any new or lasting problems. The primary care or specialist physician will see patients referred from the [facility] Emergency Department.  Manish Rodriguez

## (undated) NOTE — ED AVS SNAPSHOT
Juan Miguel Caldwell   MRN: [id number]    Department:  [facility] Emergency Department   Date of Visit:  3/27/2019           Disclosure     Insurance plans vary and the physician(s) referred by the ER may not be covered by your plan.  Please contact tell this physician (or your personal doctor if your instructions are to return to your personal doctor) about any new or lasting problems. The primary care or specialist physician will see patients referred from the [facility] Emergency Department.  John Hernandez

## (undated) NOTE — Clinical Note
I had the pleasure of seeing Manjit Francis on 7/12/2023. Please see my attached note.   Jones Liang MD FACS [facility]--Surgery

## (undated) NOTE — ED AVS SNAPSHOT
Devonte Mathis   MRN: [id number]    Department:  [facility] Emergency Department   Date of Visit:  12/2/2017           Disclosure     Insurance plans vary and the physician(s) referred by the ER may not be covered by your plan.  Please contact tell this physician (or your personal doctor if your instructions are to return to your personal doctor) about any new or lasting problems. The primary care or specialist physician will see patients referred from the [facility] Emergency Department.  Timur Ren

## (undated) NOTE — ED AVS SNAPSHOT
[facility] Emergency Department    [facility]  [address] [address]    Phone:  [phone]    Fax:  [phone]           Debbie Cyn   MRN: [id number]    Department:  [facility] Emergency Department   Date of Visit: IF THERE IS ANY CHANGE OR WORSENING OF YOUR CONDITION, CALL YOUR PRIMARY CARE PHYSICIAN AT ONCE OR RETURN IMMEDIATELY TO THE EMERGENCY DEPARTMENT.     If you have been prescribed any medication(s), please fill your prescription right away and begin taking t

## (undated) NOTE — ED AVS SNAPSHOT
[facility] Emergency Department    [facility]  [address] [address]    Phone:  [phone]    Fax:  [phone]           Nima Gomez   MRN: [id number]    Department:  [facility] Emergency Department   Date of Visit: [address] [place] [phone]  Pediatric 442 2408 Emergency Department   [phone]       To Check ER Wait Times:  TEXT 'ERwait' to 31777      Click [email or web address]      Or call [phone]    If you have any will be contacted. Please make sure we have your correct phone number before you leave. After you leave, you should follow the attached instructions. I have read and understand the instructions given to me by my caregivers.         24[facility] XR HIP W OR WO PELVIS(MIN 5 VIEWS),BILAT(CPT=73523) (Final result) Result time:  01/11/17 18:34:40    Final result    Impression:    CONCLUSION:  Negative for acute fracture.            Dictated by: Jo Watkins MD on 1/11/2017 at 18:33       Approved by: Vi